# Patient Record
Sex: FEMALE | Race: WHITE | URBAN - METROPOLITAN AREA
[De-identification: names, ages, dates, MRNs, and addresses within clinical notes are randomized per-mention and may not be internally consistent; named-entity substitution may affect disease eponyms.]

---

## 2017-03-03 ENCOUNTER — IMPORTED ENCOUNTER (OUTPATIENT)
Dept: URBAN - METROPOLITAN AREA CLINIC 50 | Facility: CLINIC | Age: 77
End: 2017-03-03

## 2018-10-10 ENCOUNTER — IMPORTED ENCOUNTER (OUTPATIENT)
Dept: URBAN - METROPOLITAN AREA CLINIC 50 | Facility: CLINIC | Age: 78
End: 2018-10-10

## 2018-10-10 NOTE — PATIENT DISCUSSION
"""Discussed dry eye diagnosis with patient.  Educated patient on proper lid hygiene and stressed ""

## 2021-04-17 ASSESSMENT — TONOMETRY
OS_IOP_MMHG: 19
OD_IOP_MMHG: 20
OS_IOP_MMHG: 19
OD_IOP_MMHG: 18

## 2021-04-17 ASSESSMENT — VISUAL ACUITY
OS_CC: 20/40
OD_CC: 20/40-1
OD_CC: J1+NEAR
OS_CC: J1+NEAR
OD_CC: 20/50
OS_CC: 20/30